# Patient Record
Sex: FEMALE | Race: WHITE | URBAN - METROPOLITAN AREA
[De-identification: names, ages, dates, MRNs, and addresses within clinical notes are randomized per-mention and may not be internally consistent; named-entity substitution may affect disease eponyms.]

---

## 2017-06-27 PROCEDURE — 87086 URINE CULTURE/COLONY COUNT: CPT | Performed by: OBSTETRICS & GYNECOLOGY

## 2017-06-27 PROCEDURE — 85025 COMPLETE CBC W/AUTO DIFF WBC: CPT | Performed by: OBSTETRICS & GYNECOLOGY

## 2017-06-27 PROCEDURE — 86780 TREPONEMA PALLIDUM: CPT | Performed by: OBSTETRICS & GYNECOLOGY

## 2017-06-27 PROCEDURE — 87491 CHLMYD TRACH DNA AMP PROBE: CPT | Performed by: OBSTETRICS & GYNECOLOGY

## 2017-06-27 PROCEDURE — 87340 HEPATITIS B SURFACE AG IA: CPT | Performed by: OBSTETRICS & GYNECOLOGY

## 2017-06-27 PROCEDURE — 86850 RBC ANTIBODY SCREEN: CPT | Performed by: OBSTETRICS & GYNECOLOGY

## 2017-06-27 PROCEDURE — 87591 N.GONORRHOEAE DNA AMP PROB: CPT | Performed by: OBSTETRICS & GYNECOLOGY

## 2017-06-27 PROCEDURE — 86901 BLOOD TYPING SEROLOGIC RH(D): CPT | Performed by: OBSTETRICS & GYNECOLOGY

## 2017-06-27 PROCEDURE — 86900 BLOOD TYPING SEROLOGIC ABO: CPT | Performed by: OBSTETRICS & GYNECOLOGY

## 2017-06-27 PROCEDURE — 88175 CYTOPATH C/V AUTO FLUID REDO: CPT | Performed by: OBSTETRICS & GYNECOLOGY

## 2017-06-27 PROCEDURE — 87389 HIV-1 AG W/HIV-1&-2 AB AG IA: CPT | Performed by: OBSTETRICS & GYNECOLOGY

## 2017-06-27 PROCEDURE — 86762 RUBELLA ANTIBODY: CPT | Performed by: OBSTETRICS & GYNECOLOGY

## 2017-07-11 PROBLEM — Z98.84 HISTORY OF GASTRIC BYPASS: Status: ACTIVE | Noted: 2017-07-11

## 2017-08-21 PROBLEM — O09.92 HIGH-RISK PREGNANCY, SECOND TRIMESTER: Status: ACTIVE | Noted: 2017-08-21

## 2017-09-05 PROBLEM — O99.210 OBESITY COMPLICATING PREGNANCY: Status: ACTIVE | Noted: 2017-09-05

## 2017-09-05 PROBLEM — Z86.79 H/O: HYPERTENSION: Status: ACTIVE | Noted: 2017-09-05

## 2017-09-05 PROBLEM — Z86.69 HISTORY OF OBSTRUCTIVE SLEEP APNEA: Status: ACTIVE | Noted: 2017-09-05

## 2017-09-05 PROBLEM — Z86.39 HISTORY OF DIABETES MELLITUS, TYPE II: Status: ACTIVE | Noted: 2017-09-05

## 2017-09-05 PROBLEM — J45.20 MILD INTERMITTENT ASTHMA WITHOUT COMPLICATION: Status: ACTIVE | Noted: 2017-09-05

## 2017-09-05 PROBLEM — E66.01 MORBID OBESITY WITH BMI OF 40.0-44.9, ADULT (HCC): Status: ACTIVE | Noted: 2017-09-05

## 2017-10-03 PROCEDURE — 82105 ALPHA-FETOPROTEIN SERUM: CPT | Performed by: OBSTETRICS & GYNECOLOGY

## 2017-10-03 PROCEDURE — 36415 COLL VENOUS BLD VENIPUNCTURE: CPT | Performed by: OBSTETRICS & GYNECOLOGY

## 2017-11-28 PROBLEM — IMO0002 FETAL ANOMALY: Status: ACTIVE | Noted: 2017-11-28

## 2017-12-04 PROBLEM — O09.93 SUPERVISION OF HIGH RISK PREGNANCY IN THIRD TRIMESTER: Status: ACTIVE | Noted: 2017-12-04

## 2018-01-15 PROCEDURE — 87081 CULTURE SCREEN ONLY: CPT | Performed by: OBSTETRICS & GYNECOLOGY

## 2018-01-15 PROCEDURE — 87653 STREP B DNA AMP PROBE: CPT | Performed by: OBSTETRICS & GYNECOLOGY

## 2018-01-20 PROBLEM — B95.1 POSITIVE GBS TEST: Status: ACTIVE | Noted: 2018-01-20

## 2018-02-22 PROBLEM — IMO0002 FETAL ANOMALY: Status: RESOLVED | Noted: 2017-11-28 | Resolved: 2018-02-10

## 2018-02-22 PROBLEM — O99.210 OBESITY COMPLICATING PREGNANCY: Status: RESOLVED | Noted: 2017-09-05 | Resolved: 2018-02-10

## 2018-06-04 PROBLEM — Z30.011 ORAL CONTRACEPTION INITIAL PRESCRIPTION: Status: ACTIVE | Noted: 2018-06-04

## 2020-07-07 ENCOUNTER — OFFICE VISIT (OUTPATIENT)
Dept: PERINATAL CARE | Facility: HOSPITAL | Age: 30
End: 2020-07-07
Attending: OBSTETRICS & GYNECOLOGY
Payer: MEDICARE

## 2020-07-07 VITALS
RESPIRATION RATE: 18 BRPM | SYSTOLIC BLOOD PRESSURE: 113 MMHG | DIASTOLIC BLOOD PRESSURE: 76 MMHG | BODY MASS INDEX: 42.74 KG/M2 | WEIGHT: 282 LBS | HEART RATE: 60 BPM | HEIGHT: 68 IN

## 2020-07-07 DIAGNOSIS — Z86.69 HISTORY OF OBSTRUCTIVE SLEEP APNEA: ICD-10-CM

## 2020-07-07 DIAGNOSIS — E66.01 MORBID OBESITY WITH BMI OF 40.0-44.9, ADULT (HCC): ICD-10-CM

## 2020-07-07 DIAGNOSIS — Z98.84 HISTORY OF GASTRIC BYPASS: ICD-10-CM

## 2020-07-07 PROCEDURE — 99215 OFFICE O/P EST HI 40 MIN: CPT | Performed by: OBSTETRICS & GYNECOLOGY

## 2020-07-07 PROCEDURE — 76801 OB US < 14 WKS SINGLE FETUS: CPT | Performed by: OBSTETRICS & GYNECOLOGY

## 2020-07-07 NOTE — PROGRESS NOTES
Pt seen in [facility] at 12 0/7 weeks gestation,ambulatory and without concerns.  First trimester screen

## 2020-07-07 NOTE — PROGRESS NOTES
Indication: hx of gastric bypass. ____________________________________________________________________________  History: Age: 26 years.  : 2 Para: 0.  ____________________________________________________________________________  Dating:  LMP: 04/ and reviewed the following issues:    OBESITY:    Obesity during pregnancy is associated with numerous maternal and  risks.   It is not clear whether obesity is a direct cause of adverse pregnancy outcome or whether the association between obesity associated with obesity-related medical and  complications, rather than an intrinsic predisposition to spontaneous  birth.  Prevention of  birth in these patients, therefore, should be directed toward prevention or management of medic raised due to a number of reported cases. There is a biologic plausibility of folate deficiency and hyperhomocysteinemia following bariatric procedures.  However, the largest study examining risk of congenital malformations in pregnancies after bariatric hale complications associated with bariatric surgery include gastric band erosion and slippage, anastomotic leaks and bleeding, stomal stenosis, and marginal ulceration.       Asthma is the most common medical condition encountered during pregnancy, occurring in discharge, and recurrent arousals from sleep.  The numerous hormonal and physiologic changes occurring in pregnancy may play a role in the prevalence and severity of ALISA among pregnant women: oropharyngeal diameter appears to narrow, Nasal patency is reduce using CPAP, another positive pressure modality, or an oral appliance prior to delivery for ALISA are instructed to bring their devices when they present for delivery.  Patients with ALISA should have their oxygenation continuously monitored during labor with a

## 2020-07-14 NOTE — PROGRESS NOTES
Erin Caban  Dear Dr. Cathryn Weiss,     Thank you for requesting ultrasound evaluation and maternal fetal medicine consultation on your patient [place] Sender.   As you are aware she is a 32/30 year old female  with a [employer] Anatomy:  Skull / Brain: appears normal. Spine: appears normal. Abdomen: appears normal. Stomach: visible. Bladder: visible. Hands: both visible but suboptimal views. Feet: both visible but suboptimal views. Fetal heart activity: present.  Fetal heart C-sections. In the study, women were assigned to riding a stationary cycle for  30 minutes 3 times per week, keeping HR<140 bpm.  I encouraged Mayte Coombs to begin moderate exercise such as walking or stationary bike in the pregnancy. ALISA - reviewed.  See MF answered to her satisfaction.       IMPRESSION:  · IUP at 12w1d  · Normal NT ultrasound  · Class III obesity  · Asthma, controlled  · ALISA, resolved after bariatric surgery     RECOMMENDATIONS:  · Continue care with Dr. Jaxon Copeland  · Weekly NST at 34 weeks  · Mo

## 2020-07-21 ENCOUNTER — OFFICE VISIT (OUTPATIENT)
Dept: PERINATAL CARE | Facility: HOSPITAL | Age: 30
End: 2020-07-21
Attending: OBSTETRICS & GYNECOLOGY
Payer: MEDICARE

## 2020-07-21 VITALS
HEART RATE: 62 BPM | DIASTOLIC BLOOD PRESSURE: 77 MMHG | BODY MASS INDEX: 43 KG/M2 | SYSTOLIC BLOOD PRESSURE: 117 MMHG | WEIGHT: 282 LBS

## 2020-07-21 DIAGNOSIS — Z86.39 HISTORY OF DIABETES MELLITUS, TYPE II: ICD-10-CM

## 2020-07-21 DIAGNOSIS — Z98.84 HISTORY OF GASTRIC BYPASS: ICD-10-CM

## 2020-07-21 DIAGNOSIS — E66.01 MORBID OBESITY WITH BMI OF 40.0-44.9, ADULT (HCC): ICD-10-CM

## 2020-07-21 DIAGNOSIS — J45.20 MILD INTERMITTENT ASTHMA WITHOUT COMPLICATION: ICD-10-CM

## 2020-07-21 DIAGNOSIS — O09.92 HIGH-RISK PREGNANCY, SECOND TRIMESTER: ICD-10-CM

## 2020-07-21 DIAGNOSIS — O09.621 HIGH-RISK PREGNANCY, YOUNG MULTIGRAVIDA IN FIRST TRIMESTER: ICD-10-CM

## 2020-07-21 DIAGNOSIS — Z86.69 HISTORY OF OBSTRUCTIVE SLEEP APNEA: ICD-10-CM

## 2020-07-21 PROCEDURE — 99215 OFFICE O/P EST HI 40 MIN: CPT | Performed by: OBSTETRICS & GYNECOLOGY

## 2020-07-21 PROCEDURE — 76813 OB US NUCHAL MEAS 1 GEST: CPT | Performed by: OBSTETRICS & GYNECOLOGY

## 2020-07-21 NOTE — PROGRESS NOTES
Pt here for 1st trimester screen  Post ultrasound pt desires [facility] cell Free DNA screen.   Informed pt do not verify insurance-may not be a covered benefit Pt verbalizes understanding wishes to proceed with [place]   Drawn with no complaint

## 2020-09-08 NOTE — PROGRESS NOTES
Flaco Mejia  Dear Dr. Carline Clark,     Thank you for requesting ultrasound evaluation and maternal fetal medicine consultation on your patient [place] Putty.  As you are aware she is a [age] year old female  with a [facility] were reviewed. See MFM note from 7/7/20 & 7/21/20 for detailed review.      ALISA - reviewed. See MFM from 7/7/20 for detailed review     H/o Bariatric surgery   See MFM note from 7/7/20 for detailed review     Asthma  in pregnancy was reviewed.   See MFM no

## 2020-09-15 ENCOUNTER — OFFICE VISIT (OUTPATIENT)
Dept: PERINATAL CARE | Facility: HOSPITAL | Age: 30
End: 2020-09-15
Attending: OBSTETRICS & GYNECOLOGY
Payer: MEDICARE

## 2020-09-15 VITALS
BODY MASS INDEX: 42 KG/M2 | WEIGHT: 275 LBS | HEART RATE: 75 BPM | DIASTOLIC BLOOD PRESSURE: 79 MMHG | SYSTOLIC BLOOD PRESSURE: 126 MMHG

## 2020-09-15 DIAGNOSIS — Z86.39 HISTORY OF DIABETES MELLITUS, TYPE II: ICD-10-CM

## 2020-09-15 DIAGNOSIS — O09.92 HIGH-RISK PREGNANCY, SECOND TRIMESTER: ICD-10-CM

## 2020-09-15 DIAGNOSIS — Z86.69 HISTORY OF OBSTRUCTIVE SLEEP APNEA: ICD-10-CM

## 2020-09-15 DIAGNOSIS — E66.01 MORBID OBESITY WITH BMI OF 40.0-44.9, ADULT (HCC): Primary | ICD-10-CM

## 2020-09-15 DIAGNOSIS — Z86.79 H/O: HYPERTENSION: ICD-10-CM

## 2020-09-15 DIAGNOSIS — Z98.84 HISTORY OF GASTRIC BYPASS: ICD-10-CM

## 2020-09-15 DIAGNOSIS — E66.01 MORBID OBESITY WITH BMI OF 40.0-44.9, ADULT (HCC): ICD-10-CM

## 2020-09-15 PROCEDURE — 76811 OB US DETAILED SNGL FETUS: CPT | Performed by: OBSTETRICS & GYNECOLOGY

## 2020-09-15 PROCEDURE — 99215 OFFICE O/P EST HI 40 MIN: CPT | Performed by: OBSTETRICS & GYNECOLOGY

## 2020-09-15 PROCEDURE — 76817 TRANSVAGINAL US OBSTETRIC: CPT

## 2020-09-15 PROCEDURE — 76817 TRANSVAGINAL US OBSTETRIC: CPT | Performed by: OBSTETRICS & GYNECOLOGY

## 2020-09-15 NOTE — PROGRESS NOTES
Pt here for level 2 ultrasound  States + occasional flutters  States had lost wt in 1st trimester now starting to gain it back   No complaints

## 2020-10-07 NOTE — PROGRESS NOTES
Dyllan Dawkins    Dear Dr. Nadia Galo    Thank you for requesting ultrasound evaluation and maternal fetal medicine consultation on your patient Sushant Rodrigez.   As you are aware she is a 26year old female  with a singlet bpm   g ( 1 lb 12 oz); 48%. MAGUI: 18 cm MVP is 7.3 cm. The fetal measurements are consistent with established EDC. No gross ultrasound evidence of structural abnormalities are seen today.  The patient understands that ultrasound cannot rule out NST at 34 weeks  · Monthly growth US in the third trimester  · Recommend  echo after delivery  · Limit gestational weight gain to 15 lbs or less    Thank you for allowing me to participate in the care of your patient.   Please do not hesitate to con

## 2020-10-21 ENCOUNTER — OFFICE VISIT (OUTPATIENT)
Dept: PERINATAL CARE | Facility: HOSPITAL | Age: 30
End: 2020-10-21
Attending: OBSTETRICS & GYNECOLOGY
Payer: MEDICARE

## 2020-10-21 ENCOUNTER — ULTRASOUND ENCOUNTER (OUTPATIENT)
Dept: PERINATAL CARE | Facility: HOSPITAL | Age: 30
End: 2020-10-21
Attending: PEDIATRICS
Payer: MEDICARE

## 2020-10-21 VITALS
HEIGHT: 68 IN | WEIGHT: 278 LBS | BODY MASS INDEX: 42.13 KG/M2 | SYSTOLIC BLOOD PRESSURE: 135 MMHG | DIASTOLIC BLOOD PRESSURE: 83 MMHG

## 2020-10-21 DIAGNOSIS — Z98.84 HISTORY OF GASTRIC BYPASS: ICD-10-CM

## 2020-10-21 DIAGNOSIS — E66.01 MORBID OBESITY WITH BMI OF 40.0-44.9, ADULT (HCC): ICD-10-CM

## 2020-10-21 DIAGNOSIS — E66.01 MORBID OBESITY WITH BMI OF 40.0-44.9, ADULT (HCC): Primary | ICD-10-CM

## 2020-10-21 DIAGNOSIS — O99.212 OBESITY AFFECTING PREGNANCY IN SECOND TRIMESTER: ICD-10-CM

## 2020-10-21 PROCEDURE — 99213 OFFICE O/P EST LOW 20 MIN: CPT | Performed by: OBSTETRICS & GYNECOLOGY

## 2020-10-21 PROCEDURE — 93325 DOPPLER ECHO COLOR FLOW MAPG: CPT | Performed by: PEDIATRICS

## 2020-10-21 PROCEDURE — 76825 ECHO EXAM OF FETAL HEART: CPT | Performed by: PEDIATRICS

## 2020-10-21 PROCEDURE — 76827 ECHO EXAM OF FETAL HEART: CPT | Performed by: PEDIATRICS

## 2020-10-21 PROCEDURE — 76816 OB US FOLLOW-UP PER FETUS: CPT | Performed by: OBSTETRICS & GYNECOLOGY

## 2020-10-21 NOTE — PROGRESS NOTES
CARDIOLOGY CONSULTATION :    Dear Teofilo Zaidi ,     Thank you for requesting fetal echocardiogram and  cardiology consultation on your Soila Row.  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  Main PA: the main pulmonary artery can be seen bifurcating into the ductus arterio were sub optimal  · Class III obesity  · Asthma, controlled  · ALISA, resolved after bariatric surgery  · Very limited unremarkable fetal echocardiogram. No major structural or functional abnormality was noted. No obvious tricuspid regurgitation was noted.  3

## 2020-10-23 DIAGNOSIS — O99.842 PREGNANCY COMPLICATED BY PREVIOUS GASTRIC BYPASS, ANTEPARTUM, SECOND TRIMESTER: Primary | ICD-10-CM

## 2020-11-11 ENCOUNTER — TELEPHONE (OUTPATIENT)
Dept: PERINATAL CARE | Facility: HOSPITAL | Age: 30
End: 2020-11-11

## 2020-11-11 NOTE — TELEPHONE ENCOUNTER
Patient sent glucoses in. Fasting  (10/14 at 95 or more). 76 was her very first one. All others are in 90s or higher.   Post breakfast 0/11 out of range  Post lunch 2/9 out of range  Post dinner 4/13 out of range    Patient wants to try longer to

## 2020-11-18 NOTE — PROGRESS NOTES
Outpatient Maternal-Fetal Medicine Consultation    Dear Dr. Juan Ohara    Thank you for requesting ultrasound evaluation and maternal fetal medicine consultation on your patient Varinder Thomas.   As you are aware she is a 26year old female  with for related birth injury to her and her baby. We talked about the increase risks associated risk of fetal hyperinsulinemia, jaundice, electrolyte imbalance, seizure activity, IUFD and other adverse obstetric outcomes.  We also reviewed her  increased risk o lbs or less  Continue four times daily capillary blood glucose assessments (fasting and 2 hour postprandial)  Weekly Maternal-Fetal Medicine review of capillary blood glucose values  Follow-up growth ultrasound every 4 weeks in the third trimester  Weekly

## 2020-11-19 ENCOUNTER — OFFICE VISIT (OUTPATIENT)
Dept: PERINATAL CARE | Facility: HOSPITAL | Age: 30
End: 2020-11-19
Attending: OBSTETRICS & GYNECOLOGY
Payer: MEDICARE

## 2020-11-19 VITALS
DIASTOLIC BLOOD PRESSURE: 80 MMHG | HEIGHT: 68 IN | HEART RATE: 76 BPM | WEIGHT: 287 LBS | BODY MASS INDEX: 43.5 KG/M2 | SYSTOLIC BLOOD PRESSURE: 132 MMHG

## 2020-11-19 DIAGNOSIS — O24.410 DIET CONTROLLED GESTATIONAL DIABETES MELLITUS (GDM) IN SECOND TRIMESTER: Primary | ICD-10-CM

## 2020-11-19 DIAGNOSIS — O24.410 DIET CONTROLLED GESTATIONAL DIABETES MELLITUS (GDM) IN SECOND TRIMESTER: ICD-10-CM

## 2020-11-19 DIAGNOSIS — E66.01 MORBID OBESITY WITH BMI OF 40.0-44.9, ADULT (HCC): ICD-10-CM

## 2020-11-19 DIAGNOSIS — Z98.84 HISTORY OF GASTRIC BYPASS: ICD-10-CM

## 2020-11-19 PROCEDURE — 76816 OB US FOLLOW-UP PER FETUS: CPT | Performed by: OBSTETRICS & GYNECOLOGY

## 2020-11-19 PROCEDURE — 99214 OFFICE O/P EST MOD 30 MIN: CPT | Performed by: OBSTETRICS & GYNECOLOGY

## 2020-11-19 NOTE — PROGRESS NOTES
Pt her for growth ultrasound  States + FM  Has concerns and questions about return of thrush as she had in the beginning of preg.

## 2020-11-30 ENCOUNTER — TELEPHONE (OUTPATIENT)
Dept: PERINATAL CARE | Facility: HOSPITAL | Age: 30
End: 2020-11-30

## 2020-11-30 NOTE — TELEPHONE ENCOUNTER
GA 31    Diet    Fasting 2 out of 8         Range 82 to 97  Breakfast 0 out of 8      Range 83 to 114  Lunch 0 out of 6            Range 92 to 110  Dinner 0 out of 8           Range 86 to 112

## 2020-12-10 ENCOUNTER — TELEPHONE (OUTPATIENT)
Dept: PERINATAL CARE | Facility: HOSPITAL | Age: 30
End: 2020-12-10

## 2020-12-10 NOTE — TELEPHONE ENCOUNTER
GA 32 2/7    Diet    Fasting 0 out of 6        Range 81 to 91  Breakfast 0 out of 3     Range 86 to 99  Lunch 0 out of 3          Range 87 to 92  Dinner 0 out of 5          Range 98 to 107

## 2020-12-10 NOTE — TELEPHONE ENCOUNTER
No change - continue current regimen. Continue ADA diet      Tim Murillo M.D.   Maternal-Fetal Medicine

## 2020-12-16 NOTE — PROGRESS NOTES
Outpatient Maternal-Fetal Medicine Follow-Up    Dear Aung Dsouza    Thank you for requesting ultrasound evaluation and maternal fetal medicine consultation on your patient Ervin Contreras.   As you are aware she is a 26year old female  with a si capillary blood glucose assessments to every other day.     Continued medication use and capillary blood glucose assessments were reviewed as well as recommendations for serial growth ultrasounds and antepartum testing.       Medical Regimen Recommendation:

## 2020-12-17 ENCOUNTER — ULTRASOUND ENCOUNTER (OUTPATIENT)
Dept: PERINATAL CARE | Facility: HOSPITAL | Age: 30
End: 2020-12-17
Attending: OBSTETRICS & GYNECOLOGY
Payer: MEDICARE

## 2020-12-17 VITALS
WEIGHT: 287 LBS | BODY MASS INDEX: 43.5 KG/M2 | SYSTOLIC BLOOD PRESSURE: 131 MMHG | HEIGHT: 68 IN | HEART RATE: 78 BPM | DIASTOLIC BLOOD PRESSURE: 81 MMHG

## 2020-12-17 DIAGNOSIS — O24.419 GDM (GESTATIONAL DIABETES MELLITUS): Primary | ICD-10-CM

## 2020-12-17 DIAGNOSIS — E66.01 MORBID OBESITY WITH BMI OF 40.0-44.9, ADULT (HCC): ICD-10-CM

## 2020-12-17 DIAGNOSIS — O99.213 OBESITY AFFECTING PREGNANCY IN THIRD TRIMESTER: ICD-10-CM

## 2020-12-17 DIAGNOSIS — O24.419 GDM (GESTATIONAL DIABETES MELLITUS): ICD-10-CM

## 2020-12-17 DIAGNOSIS — O24.410 DIET CONTROLLED GESTATIONAL DIABETES MELLITUS (GDM) IN THIRD TRIMESTER: ICD-10-CM

## 2020-12-17 PROCEDURE — 76816 OB US FOLLOW-UP PER FETUS: CPT | Performed by: OBSTETRICS & GYNECOLOGY

## 2020-12-17 PROCEDURE — 76819 FETAL BIOPHYS PROFIL W/O NST: CPT

## 2020-12-17 PROCEDURE — 99213 OFFICE O/P EST LOW 20 MIN: CPT | Performed by: OBSTETRICS & GYNECOLOGY

## 2020-12-17 PROCEDURE — 76819 FETAL BIOPHYS PROFIL W/O NST: CPT | Performed by: OBSTETRICS & GYNECOLOGY

## (undated) NOTE — Clinical Note
Continue care with Dr. Lucia BRUMFIELD at 29 weeksMonthly growth US in the third trimesterFetal echocardiogram with Dr. Gunnar Osorio (10/21/20)Limit gestational weight gain to 15 lbs or less